# Patient Record
Sex: MALE | Race: WHITE
[De-identification: names, ages, dates, MRNs, and addresses within clinical notes are randomized per-mention and may not be internally consistent; named-entity substitution may affect disease eponyms.]

---

## 2017-09-08 ENCOUNTER — HOSPITAL ENCOUNTER (OUTPATIENT)
Dept: HOSPITAL 53 - M WUC | Age: 22
End: 2017-09-08
Attending: PHYSICIAN ASSISTANT
Payer: COMMERCIAL

## 2017-09-08 DIAGNOSIS — J02.9: Primary | ICD-10-CM

## 2017-09-11 LAB
CONTROL LINE MONO EB: (no result)
HETEROPH AB SER QL LA: NEGATIVE

## 2018-07-19 ENCOUNTER — HOSPITAL ENCOUNTER (OUTPATIENT)
Dept: HOSPITAL 53 - M LAB REF | Age: 23
End: 2018-07-19
Attending: PHYSICIAN ASSISTANT
Payer: COMMERCIAL

## 2018-07-19 DIAGNOSIS — J02.9: Primary | ICD-10-CM

## 2018-07-19 PROCEDURE — 87081 CULTURE SCREEN ONLY: CPT

## 2020-03-12 ENCOUNTER — HOSPITAL ENCOUNTER (OUTPATIENT)
Dept: HOSPITAL 53 - M WUC | Age: 25
End: 2020-03-12
Attending: PHYSICIAN ASSISTANT
Payer: COMMERCIAL

## 2020-03-12 DIAGNOSIS — R05: Primary | ICD-10-CM

## 2020-03-13 NOTE — REP
Clinical:  Cough .

 

Comparison: 04/11/2007 .

 

Technique:  PA and lateral.

 

Findings:

The mediastinum and cardiac silhouette are normal.  The lung fields are clear and

without acute consolidation, effusion, or pneumothorax.  The skeletal structures

are intact and normal.

 

Impression:

1.   No acute cardiopulmonary process.

 

 

Electronically Signed by

Tacos Lyles MD 03/13/2020 03:42 A